# Patient Record
Sex: FEMALE | Employment: PART TIME
[De-identification: names, ages, dates, MRNs, and addresses within clinical notes are randomized per-mention and may not be internally consistent; named-entity substitution may affect disease eponyms.]

---
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
## 2023-03-14 ENCOUNTER — TRANSCRIBE ORDERS (OUTPATIENT)
Dept: OTHER | Age: 18
End: 2023-03-14

## 2023-03-14 ENCOUNTER — MEDICAL CORRESPONDENCE (OUTPATIENT)
Dept: HEALTH INFORMATION MANAGEMENT | Facility: CLINIC | Age: 18
End: 2023-03-14
Payer: COMMERCIAL

## 2023-03-14 DIAGNOSIS — D35.2 PITUITARY MICROADENOMA (H): Primary | ICD-10-CM

## 2023-04-11 ENCOUNTER — MEDICAL CORRESPONDENCE (OUTPATIENT)
Dept: HEALTH INFORMATION MANAGEMENT | Facility: CLINIC | Age: 18
End: 2023-04-11
Payer: COMMERCIAL

## 2023-04-11 ENCOUNTER — TRANSFERRED RECORDS (OUTPATIENT)
Dept: HEALTH INFORMATION MANAGEMENT | Facility: CLINIC | Age: 18
End: 2023-04-11
Payer: COMMERCIAL

## 2023-04-12 ENCOUNTER — LAB (OUTPATIENT)
Dept: LAB | Facility: CLINIC | Age: 18
End: 2023-04-12
Payer: COMMERCIAL

## 2023-04-12 DIAGNOSIS — D35.2 PITUITARY EPIDERMOID TUMOR (H): Primary | ICD-10-CM

## 2023-04-12 LAB — TSH SERPL DL<=0.005 MIU/L-ACNC: 1.94 UIU/ML (ref 0.5–4.3)

## 2023-04-12 PROCEDURE — 83002 ASSAY OF GONADOTROPIN (LH): CPT

## 2023-04-12 PROCEDURE — 83001 ASSAY OF GONADOTROPIN (FSH): CPT

## 2023-04-12 PROCEDURE — 36415 COLL VENOUS BLD VENIPUNCTURE: CPT

## 2023-04-12 PROCEDURE — 84305 ASSAY OF SOMATOMEDIN: CPT

## 2023-04-12 PROCEDURE — 82533 TOTAL CORTISOL: CPT

## 2023-04-12 PROCEDURE — 84443 ASSAY THYROID STIM HORMONE: CPT

## 2023-04-12 PROCEDURE — 84146 ASSAY OF PROLACTIN: CPT

## 2023-04-12 PROCEDURE — 83003 ASSAY GROWTH HORMONE (HGH): CPT

## 2023-04-13 LAB
CORTIS SERPL-MCNC: 6.6 UG/DL
FSH SERPL IRP2-ACNC: 5.3 MIU/ML
GH SERPL-MCNC: 1.1 UG/L
LH SERPL-ACNC: 9.9 MIU/ML
PROLACTIN SERPL 3RD IS-MCNC: 194 NG/ML (ref 5–23)

## 2023-04-14 LAB — IGF-I BLD-MCNC: 317 NG/ML (ref 113–399)

## 2023-04-17 ENCOUNTER — TRANSCRIBE ORDERS (OUTPATIENT)
Dept: OTHER | Age: 18
End: 2023-04-17

## 2023-04-17 DIAGNOSIS — H50.112 EXOTROPIA OF LEFT EYE: ICD-10-CM

## 2023-04-17 DIAGNOSIS — R79.89 PROLACTIN INCREASED: ICD-10-CM

## 2023-04-17 DIAGNOSIS — D35.2 PITUITARY MICROADENOMA (H): Primary | ICD-10-CM

## 2023-04-18 ASSESSMENT — ENCOUNTER SYMPTOMS
HEADACHES: 1
SPEECH CHANGE: 0
LOSS OF CONSCIOUSNESS: 0
BREAST MASS: 0
BREAST PAIN: 0
MEMORY LOSS: 0
TREMORS: 0
NUMBNESS: 0
DECREASED LIBIDO: 0
WEAKNESS: 0
DISTURBANCES IN COORDINATION: 0
TINGLING: 1
PARALYSIS: 0
SEIZURES: 0
HOT FLASHES: 0
DIZZINESS: 0

## 2023-04-18 NOTE — TELEPHONE ENCOUNTER
RECORDS RECEIVED FROM: internal /External /ce   DATE RECEIVED: 4.24.23   NOTES (FOR ALL VISITS) STATUS DETAILS   OFFICE NOTES from referring provider External  Dr. Jon Rodriguez @ [facility]   OFFICE NOTES from other specialist ce [facility]- 4.11.23 Michael   3.14.23 [facility]      MEDICATION LIST internal       LABS     DIABETES: HBGA1C, CREATININE, FASTING LIPIDS, MICROALBUMIN URINE, POTASSIUM, TSH, T4    THYROID: TSH, T4, CBC, THYRODLONULIN, TOTAL T3, FREE T4, CALCITONIN, CEA internal /External /ce Prolactin 4.12.22  FSH-  4.12.22  TSH- 4.12.22  T4- 4.12.22  Cortisol-  4.12.22

## 2023-04-24 ENCOUNTER — PRE VISIT (OUTPATIENT)
Dept: ENDOCRINOLOGY | Facility: CLINIC | Age: 18
End: 2023-04-24

## 2023-04-24 ENCOUNTER — OFFICE VISIT (OUTPATIENT)
Dept: ENDOCRINOLOGY | Facility: CLINIC | Age: 18
End: 2023-04-24
Attending: PSYCHIATRY & NEUROLOGY
Payer: COMMERCIAL

## 2023-04-24 VITALS
OXYGEN SATURATION: 98 % | HEART RATE: 87 BPM | DIASTOLIC BLOOD PRESSURE: 72 MMHG | TEMPERATURE: 98.4 F | SYSTOLIC BLOOD PRESSURE: 119 MMHG | HEIGHT: 62 IN | WEIGHT: 133 LBS | BODY MASS INDEX: 24.48 KG/M2

## 2023-04-24 DIAGNOSIS — D35.2 PITUITARY MICROADENOMA (H): ICD-10-CM

## 2023-04-24 DIAGNOSIS — H50.112 EXOTROPIA OF LEFT EYE: ICD-10-CM

## 2023-04-24 DIAGNOSIS — E22.1 HYPERPROLACTINEMIA (H): Primary | ICD-10-CM

## 2023-04-24 DIAGNOSIS — R79.89 PROLACTIN INCREASED: ICD-10-CM

## 2023-04-24 PROCEDURE — 99203 OFFICE O/P NEW LOW 30 MIN: CPT | Performed by: INTERNAL MEDICINE

## 2023-04-24 RX ORDER — CABERGOLINE 0.5 MG/1
0.25 TABLET ORAL
Qty: 12 TABLET | Refills: 4 | Status: SHIPPED | OUTPATIENT
Start: 2023-04-24 | End: 2023-08-01

## 2023-04-24 ASSESSMENT — PAIN SCALES - GENERAL: PAINLEVEL: NO PAIN (0)

## 2023-04-24 NOTE — PATIENT INSTRUCTIONS
Start cabergoline 0.5 tab two times per WEEK. Take this at bedtime to reduce chances of side effects.      Followup in ~3 months (in person or virtual) with repeat labs 1-2 weeks ahead of visit.

## 2023-04-24 NOTE — PROGRESS NOTES
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  -expect galactorrhea and menstrual irregularities to improve and normalize with treatment  -plan to treat with cabergoline to normal PRL level then maintain for ~2 years before repeating MRI and evaluating for whether we can stop medication  -not causing optic issues at this time    Plan:  -start cabergoline 0.25 twice weekly  -followup in 3 months with labs 1-2 weeks ahead of visit  -instructed pt to seek evaluation if she gets thunderclap headache but would be very rare for a microadenoma to hemorrhage  -stop cabergoline if she gets pregnant and intends to keep pregnancy to allow for breast milk duct production    I have independently reviewed and interpreted labs, imaging as indicated.      Chief complaint:  Rosalie is a 17 year old female seen in consultation for hyperprolactinemia       HISTORY OF PRESENT ILLNESS    Rosalie is a 17F with minimal PMH.     She was found to have hyperprolactinemia after evaluation of irregular menstrual periods. She endorses irregular menses since about August 2022.  Prior to that periods were regular.     She also endorsed twitching of her head at the time.     Has hx of vitamin D deficiency, but otherwise no medical issues.  Not on antipsychotic mediations.     She also endorses galactorrhea for about the last 2 years.      Denies family hx of pituitary or other endocrine disorders.     On labs, TSH was normal.  I do not have MRI report but outside neurology note reports MRI was done in March and showed 8mm microadenoma not compressing the chiasm.     Endocrine relevant labs and/or imaging are as follows:  Component      Latest Ref Rng 4/12/2023  2:50 PM   Cortisol Serum        ug/dL 6.6    TSH      0.50 - 4.30 uIU/mL 1.94    FSH      mIU/mL 5.3    Luteinizing  Hormone      mIU/mL 9.9    Prolactin      5 - 23 ng/mL 194 (H)    Ins Growth Factor 1      113 - 399 ng/mL 317    Growth Hormone      <7.0 ug/L 1.1       Legend:  (H) High    3/10/2023 - MRI pituitary per neurology notes in care everywhere states 8mm pituitary microadenoma was found.      REVIEW OF SYSTEMS  Answers for HPI/ROS submitted by the patient on 4/18/2023  General Symptoms: No  Skin Symptoms: No  HENT Symptoms: No  EYE SYMPTOMS: No  HEART SYMPTOMS: No  LUNG SYMPTOMS: No  INTESTINAL SYMPTOMS: No  URINARY SYMPTOMS: No  GYNECOLOGIC SYMPTOMS: Yes  BREAST SYMPTOMS: Yes  SKELETAL SYMPTOMS: No  BLOOD SYMPTOMS: No  NERVOUS SYSTEM SYMPTOMS: Yes  MENTAL HEALTH SYMPTOMS: No  PEDS Symptoms: No  Bleeding or spotting between periods: Yes  Heavy or painful periods: No  Irregular periods: Yes  Vaginal discharge: No  Hot flashes: No  Vaginal dryness: No  Genital ulcers: No  Reduced libido: No  Painful intercourse: No  Difficulty with sexual arousal: No  Post-menopausal bleeding: No  Discharge: Yes  Lumps: No  Pain: No  Nipple retraction: No  Trouble with coordination: No  Dizziness or trouble with balance: No  Fainting or black-out spells: No  Memory loss: No  Headache: Yes  Seizures: No  Speech problems: No  Tingling: Yes  Tremor: No  Weakness: No  Difficulty walking: No  Paralysis: No  Numbness: No      10 system ROS otherwise as per the HPI or negative    Past Medical History  History reviewed. No pertinent past medical history.    Medications  Current Outpatient Medications   Medication Sig Dispense Refill     FLINTSTONES COMPLETE OR CHEW 1 daily 0 0       Allergies  Allergies   Allergen Reactions     Amoxicillin Hives and Swelling     Apple      Other reaction(s): Angioedema     Avocado Hives     Banana      Other reaction(s): Angioedema     Kiwi      Other reaction(s): Angioedema     Pineapple      Other reaction(s): Angioedema         Family History  family history includes C.A.D. in her paternal grandfather; Eye  "Disorder in her maternal grandmother; Hypertension in her paternal grandfather and paternal grandmother.    Social History  Social History     Tobacco Use     Smoking status: Never     Smokeless tobacco: Never     Tobacco comments:     none in home   Substance Use Topics     Alcohol use: No     Drug use: No       Physical Exam  Ht 1.575 m (5' 2\")   Wt 60.3 kg (133 lb)   LMP  (LMP Unknown)   BMI 24.33 kg/m    Body mass index is 24.33 kg/m .    Physical Exam    GENERAL :  In no apparent distress  SKIN: Normal color, normal temperature     EYES: EOMI, No scleral icterus  NECK: No visible masses  RESP: No respiratory distress, normal effort  CARDIO: regular rate  ABDOMEN: flat, nondistended       NEURO: awake, alert, responds appropriately to questions   EXTREMITIES: No clubbing, cyanosis or edema.    I spent a total of 31 minutes on the day of this new patient visit on chart review, lab review, coordinating care, reviewing previous providers notes, exam and counseling of patient  "

## 2023-04-24 NOTE — NURSING NOTE
"Chief Complaint   Patient presents with     Endocrine Problem     Vital signs:  Temp: 98.4  F (36.9  C) Temp src: Oral BP: 119/72 Pulse: 87     SpO2: 98 %     Height: 157.5 cm (5' 2\") Weight: 60.3 kg (133 lb)  Estimated body mass index is 24.33 kg/m  as calculated from the following:    Height as of this encounter: 1.575 m (5' 2\").    Weight as of this encounter: 60.3 kg (133 lb).          "

## 2023-08-01 ENCOUNTER — VIRTUAL VISIT (OUTPATIENT)
Dept: ENDOCRINOLOGY | Facility: CLINIC | Age: 18
End: 2023-08-01
Payer: COMMERCIAL

## 2023-08-01 VITALS — WEIGHT: 125 LBS | BODY MASS INDEX: 23 KG/M2 | HEIGHT: 62 IN

## 2023-08-01 DIAGNOSIS — E22.1 HYPERPROLACTINEMIA (H): ICD-10-CM

## 2023-08-01 DIAGNOSIS — D35.2 PITUITARY MICROADENOMA (H): ICD-10-CM

## 2023-08-01 PROCEDURE — 99213 OFFICE O/P EST LOW 20 MIN: CPT | Mod: VID | Performed by: INTERNAL MEDICINE

## 2023-08-01 ASSESSMENT — PAIN SCALES - GENERAL: PAINLEVEL: NO PAIN (0)

## 2023-08-01 NOTE — PATIENT INSTRUCTIONS
Come in for repeat lab at [facility] at 1:30pm on thurs 8/3.      Continue cabergoline 1/2 tab twice weekly for now.  We will adjust dose if indicated by lab results.      Followup in 6 months.  Repeat prolactin level 1 week ahead of visit.

## 2023-08-01 NOTE — PROGRESS NOTES
Rosalie Vitale is a 18 year old who is being evaluated via a billable video visit.      How would you like to obtain your AVS? MyChart  If the video visit is dropped, the invitation should be resent by: Send to e-mail at: [email or web address]  Will anyone else be joining your video visit? 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  If prolactin still above ULN, will go up to 0.5mg twice weekly  -followup in 6 months with repeat prolactin ahead of visit  -stop cabergoline if she gets pregnant and intends to keep pregnancy to allow for breast milk duct production    I have independently reviewed and interpreted labs, imaging as indicated.    RTC 6 months    Addendum - PRL came back improved but elevated.  Will increase cabergoline to 0.5 twice weekly.     Chief complaint:  Rosalie is a 18 year old female seen in consultation for hyperprolactinemia       HISTORY OF PRESENT ILLNESS    Rosalie is a 18F here for followup after starting cabergoline for prolactinoma.     Initial HPI:  She was found to have hyperprolactinemia after evaluation of irregular menstrual periods. She endorses irregular menses since about August 2022.  Prior to that periods were regular.     She also endorsed twitching of her head at the time.     Has hx of vitamin D deficiency, but otherwise no medical issues.  Not on antipsychotic mediations.      She also endorses galactorrhea for about the last 2 years.      Denies family hx of pituitary or other endocrine disorders.     On labs, TSH was normal.  I do not have MRI report but outside neurology note reports MRI was done in March and showed 8mm microadenoma not compressing the chiasm.     Today: Reports minimal change during the first month on prolactin.  However, in about mid-may cycles resumed and are now normal.  Still having galactorrhea.,  loss of appetite first month resolved.  Taking at night to minimize symptoms. Not having frequent nausea.     Still having headaches.  Possible some improvement in that.  No visual field abnormalities or new vision issues.       Endocrine relevant labs and/or imaging are as follows:  Component      Latest Ref Rng 4/12/2023  2:50 PM   Cortisol Serum        ug/dL 6.6    TSH      0.50 - 4.30 uIU/mL 1.94    FSH      mIU/mL 5.3    Luteinizing Hormone      mIU/mL 9.9    Prolactin      5 - 23 ng/mL 194 (H)    Ins Growth Factor 1      113 - 399 ng/mL 317    Growth Hormone      <7.0 ug/L 1.1       Legend:  (H) High    3/10/2023 - MRI pituitary per neurology notes in care everywhere states 8mm pituitary microadenoma was found.      REVIEW OF SYSTEMS    10 system ROS otherwise as per the HPI or negative    Past Medical History  Past Medical History:   Diagnosis Date    Vitamin D deficiency     copied from [employer]       Medications  Current Outpatient Medications   Medication Sig Dispense Refill    cabergoline (DOSTINEX) 0.5 MG tablet Take 0.5 tablets (0.25 mg) by mouth twice a week 12 tablet 4       Allergies  Allergies   Allergen Reactions    Amoxicillin Hives, Itching, Swelling and Difficulty breathing     Other Reaction(s): Swelling, lips/tongue    Apple Juice      Other reaction(s): Angioedema    Avocado Hives    Banana      Other reaction(s): Angioedema    Kiwi      Other reaction(s): Angioedema    Pineapple      Other reaction(s): Angioedema         Family  "History  family history includes C.A.D. in her paternal grandfather; Eye Disorder in her maternal grandmother; Hypertension in her paternal grandfather and paternal grandmother.    Social History  Social History     Tobacco Use    Smoking status: Never    Smokeless tobacco: Never    Tobacco comments:     none in home   Substance Use Topics    Alcohol use: No    Drug use: No       Physical Exam  Ht 1.575 m (5' 2\")   Wt 56.7 kg (125 lb)   BMI 22.86 kg/m    Body mass index is 22.86 kg/m .    Physical Exam    GENERAL :  In no apparent distress  SKIN: Normal color, normal temperature     EYES: EOMI, No scleral icterus  NECK: No visible masses  RESP: No respiratory distress, normal effort  CARDIO: regular rate  ABDOMEN: flat, nondistended       NEURO: awake, alert, responds appropriately to questions   EXTREMITIES: No clubbing, cyanosis or edema.    I spent a total of 21 minutes on the day of this established patient visit on chart review, lab review, coordinating care, reviewing previous providers notes, exam and counseling of patient    Answers submitted by the patient for this visit:  Symptoms you have experienced in the last 30 days (Submitted on 7/31/2023)  General Symptoms: No  Skin Symptoms: No  HENT Symptoms: No  EYE SYMPTOMS: No  HEART SYMPTOMS: No  LUNG SYMPTOMS: No  INTESTINAL SYMPTOMS: No  URINARY SYMPTOMS: No  GYNECOLOGIC SYMPTOMS: No  BREAST SYMPTOMS: No  SKELETAL SYMPTOMS: No  BLOOD SYMPTOMS: No  NERVOUS SYSTEM SYMPTOMS: No  MENTAL HEALTH SYMPTOMS: No    "

## 2023-08-01 NOTE — LETTER
8/1/2023         RE: Rosalie Vitale  [address]  [address]  [place] [address]        Dear Colleague,    Thank you for referring your patient, Rosalie Vitale, to the [facility] ENDOCR[facility]. Please see a copy of my visit note below.    Rosalie Vitale is a 18 year old who is being evaluated via a billable video visit.      How would you like to obtain your AVS? MyChart  If the video visit is dropped, the invitation should be resent by: Send to e-mail at: [email or web address]  Will anyone else be joining your video visit? 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  If prolactin still above ULN, will go up to 0.5mg twice weekly  -followup in 6 months with repeat prolactin ahead of visit  -stop cabergoline if she gets pregnant and intends to keep pregnancy to allow for breast milk duct production    I have independently reviewed and interpreted labs, imaging as indicated.    RTC 6 months    Addendum - PRL came back improved but elevated.  Will increase cabergoline to 0.5 twice weekly.     Chief complaint:  Rosalie is a 18 year old female seen in consultation for hyperprolactinemia       HISTORY OF PRESENT ILLNESS    Rosalie is a 18F here for followup after starting cabergoline for prolactinoma.     Initial HPI:  She was found to have hyperprolactinemia after evaluation of  irregular menstrual periods. She endorses irregular menses since about August 2022.  Prior to that periods were regular.     She also endorsed twitching of her head at the time.     Has hx of vitamin D deficiency, but otherwise no medical issues.  Not on antipsychotic mediations.     She also endorses galactorrhea for about the last 2 years.      Denies family hx of pituitary or other endocrine disorders.     On labs, TSH was normal.  I do not have MRI report but outside neurology note reports MRI was done in March and showed 8mm microadenoma not compressing the chiasm.     Today: Reports minimal change during the first month on prolactin.  However, in about mid-may cycles resumed and are now normal.  Still having galactorrhea.,  loss of appetite first month resolved.  Taking at night to minimize symptoms. Not having frequent nausea.     Still having headaches.  Possible some improvement in that.  No visual field abnormalities or new vision issues.       Endocrine relevant labs and/or imaging are as follows:  Component      Latest Ref Rng 4/12/2023  2:50 PM   Cortisol Serum        ug/dL 6.6    TSH      0.50 - 4.30 uIU/mL 1.94    FSH      mIU/mL 5.3    Luteinizing Hormone      mIU/mL 9.9    Prolactin      5 - 23 ng/mL 194 (H)    Ins Growth Factor 1      113 - 399 ng/mL 317    Growth Hormone      <7.0 ug/L 1.1       Legend:  (H) High    3/10/2023 - MRI pituitary per neurology notes in care everywhere states 8mm pituitary microadenoma was found.      REVIEW OF SYSTEMS    10 system ROS otherwise as per the HPI or negative    Past Medical History  Past Medical History:   Diagnosis Date     Vitamin D deficiency     copied from [employer]       Medications  Current Outpatient Medications   Medication Sig Dispense Refill     cabergoline (DOSTINEX) 0.5 MG tablet Take 0.5 tablets (0.25 mg) by mouth twice a week 12 tablet 4       Allergies  Allergies   Allergen Reactions     Amoxicillin Hives, Itching, Swelling and  "Difficulty breathing     Other Reaction(s): Swelling, lips/tongue     Apple Juice      Other reaction(s): Angioedema     Avocado Hives     Banana      Other reaction(s): Angioedema     Kiwi      Other reaction(s): Angioedema     Pineapple      Other reaction(s): Angioedema         Family History  family history includes C.A.D. in her paternal grandfather; Eye Disorder in her maternal grandmother; Hypertension in her paternal grandfather and paternal grandmother.    Social History  Social History     Tobacco Use     Smoking status: Never     Smokeless tobacco: Never     Tobacco comments:     none in home   Substance Use Topics     Alcohol use: No     Drug use: No       Physical Exam  Ht 1.575 m (5' 2\")   Wt 56.7 kg (125 lb)   BMI 22.86 kg/m    Body mass index is 22.86 kg/m .    Physical Exam    GENERAL :  In no apparent distress  SKIN: Normal color, normal temperature     EYES: EOMI, No scleral icterus  NECK: No visible masses  RESP: No respiratory distress, normal effort  CARDIO: regular rate  ABDOMEN: flat, nondistended       NEURO: awake, alert, responds appropriately to questions   EXTREMITIES: No clubbing, cyanosis or edema.    I spent a total of 21 minutes on the day of this established patient visit on chart review, lab review, coordinating care, reviewing previous providers notes, exam and counseling of patient    Answers submitted by the patient for this visit:  Symptoms you have experienced in the last 30 days (Submitted on 7/31/2023)  General Symptoms: No  Skin Symptoms: No  HENT Symptoms: No  EYE SYMPTOMS: No  HEART SYMPTOMS: No  LUNG SYMPTOMS: No  INTESTINAL SYMPTOMS: No  URINARY SYMPTOMS: No  GYNECOLOGIC SYMPTOMS: No  BREAST SYMPTOMS: No  SKELETAL SYMPTOMS: No  BLOOD SYMPTOMS: No  NERVOUS SYSTEM SYMPTOMS: No  MENTAL HEALTH SYMPTOMS: No      Again, thank you for allowing me to participate in the care of your patient.        Sincerely,        Buster Bradley MD  "

## 2023-08-03 ENCOUNTER — LAB (OUTPATIENT)
Dept: LAB | Facility: CLINIC | Age: 18
End: 2023-08-03
Payer: COMMERCIAL

## 2023-08-03 DIAGNOSIS — D35.2 PITUITARY MICROADENOMA (H): ICD-10-CM

## 2023-08-03 DIAGNOSIS — E22.1 HYPERPROLACTINEMIA (H): ICD-10-CM

## 2023-08-03 LAB — PROLACTIN SERPL 3RD IS-MCNC: 50 NG/ML (ref 5–23)

## 2023-08-03 PROCEDURE — 84146 ASSAY OF PROLACTIN: CPT

## 2023-08-03 PROCEDURE — 36415 COLL VENOUS BLD VENIPUNCTURE: CPT

## 2023-08-10 RX ORDER — CABERGOLINE 0.5 MG/1
0.5 TABLET ORAL
Qty: 24 TABLET | Refills: 4 | Status: SHIPPED | OUTPATIENT
Start: 2023-08-10 | End: 2024-01-11

## 2023-09-16 ENCOUNTER — HEALTH MAINTENANCE LETTER (OUTPATIENT)
Age: 18
End: 2023-09-16

## 2024-01-11 ENCOUNTER — MYC REFILL (OUTPATIENT)
Dept: ENDOCRINOLOGY | Facility: CLINIC | Age: 19
End: 2024-01-11
Payer: COMMERCIAL

## 2024-01-11 DIAGNOSIS — D35.2 PITUITARY MICROADENOMA (H): ICD-10-CM

## 2024-01-11 DIAGNOSIS — E22.1 HYPERPROLACTINEMIA (H): ICD-10-CM

## 2024-01-11 NOTE — TELEPHONE ENCOUNTER
Medication not on nurse protocol. Sent to provider for review. No future appt made yet.   Gina Pantoja RN BSN PHN        LOV 8/2023  Attending Assessment/Plan :         Hyperprolactinemia (H)  Pituitary microadenoma (H)     Assessment:  -hyperprolactinemia 2/2 prolactin secreting pituitary microadenoma  -amenorrhea resolved, still having galactorrhea so level may not yet be normalized  -needs repeat PRL and dose adjustment if indicated  -plan to treat with cabergoline to normal PRL level then maintain for ~2 years before repeating MRI and evaluating for whether we can stop medication     Plan:  -recheck prolactin level  -continue cabergoline 0.25 twice weekly.  If prolactin still above ULN, will go up to 0.5mg twice weekly  -followup in 6 months with repeat prolactin ahead of visit  -stop cabergoline if she gets pregnant and intends to keep pregnancy to allow for breast milk duct production     I have independently reviewed and interpreted labs, imaging as indicated.    RTC 6 months     Addendum - PRL came back improved but elevated.  Will increase cabergoline to 0.5 twice weekly

## 2024-01-23 RX ORDER — CABERGOLINE 0.5 MG/1
0.5 TABLET ORAL
Qty: 24 TABLET | Refills: 4 | Status: SHIPPED | OUTPATIENT
Start: 2024-01-25

## 2024-11-09 ENCOUNTER — HEALTH MAINTENANCE LETTER (OUTPATIENT)
Age: 19
End: 2024-11-09

## 2025-02-11 ENCOUNTER — MYC REFILL (OUTPATIENT)
Dept: ENDOCRINOLOGY | Facility: CLINIC | Age: 20
End: 2025-02-11
Payer: COMMERCIAL

## 2025-02-11 DIAGNOSIS — D35.2 PITUITARY MICROADENOMA (H): ICD-10-CM

## 2025-02-11 DIAGNOSIS — E22.1 HYPERPROLACTINEMIA: ICD-10-CM

## 2025-02-11 RX ORDER — CABERGOLINE 0.5 MG/1
0.5 TABLET ORAL
Qty: 24 TABLET | Refills: 4 | Status: CANCELLED | OUTPATIENT
Start: 2025-02-13

## 2025-02-13 NOTE — TELEPHONE ENCOUNTER
Patient just moved to [place]. She lives in the [place] area and is having trouble getting her medical records down there. She knows that Dr. Fraire is no longer with [facility] but is running out of medication. She knows we are unable to refill. She will call back if she is unable to get a refill.